# Patient Record
Sex: FEMALE | Race: WHITE | URBAN - METROPOLITAN AREA
[De-identification: names, ages, dates, MRNs, and addresses within clinical notes are randomized per-mention and may not be internally consistent; named-entity substitution may affect disease eponyms.]

---

## 2020-06-10 ENCOUNTER — TELEPHONE (OUTPATIENT)
Dept: SURGERY | Facility: CLINIC | Age: 50
End: 2020-06-10

## 2020-06-10 NOTE — TELEPHONE ENCOUNTER
Reason for call:  Other   Patient called regarding (reason for call): call back  Additional comments: Patient would like a call back to discuss if she can schedule for the bariatric program if her insurance does not cover the procedure. She is wondering about self pay options. Please reach out to the patient at either home or mobile number and it is ok to leave a detailed voicemail on either.     Phone number to reach patient:  Cell number on file:    Telephone Information:   Mobile [phone]       Best Time:  anytime    Can we leave a detailed message on this number?  YES    Travel screening: Not Applicable

## 2020-06-12 NOTE — TELEPHONE ENCOUNTER
I called patient and left her a message with info she requested. Also left the phone # for the cost care line for patient to get an estimate.